# Patient Record
Sex: FEMALE | Race: WHITE | Employment: FULL TIME | URBAN - METROPOLITAN AREA
[De-identification: names, ages, dates, MRNs, and addresses within clinical notes are randomized per-mention and may not be internally consistent; named-entity substitution may affect disease eponyms.]

---

## 2017-01-03 ENCOUNTER — HOSPITAL ENCOUNTER (OUTPATIENT)
Dept: OTHER | Age: 32
Discharge: HOME | End: 2017-01-03
Attending: OBSTETRICS & GYNECOLOGY | Admitting: OBSTETRICS & GYNECOLOGY

## 2017-01-03 DIAGNOSIS — O99.810 ABNORMAL GLUCOSE COMPLICATING PREGNANCY: ICD-10-CM

## 2017-01-03 LAB
GLUCOSE FASTING: 85 MG/DL
GLUCOSE TOLERANCE TEST 1 HOUR: 201 MG/DL
GLUCOSE TOLERANCE TEST 2 HOUR: 167 MG/DL
GLUCOSE TOLERANCE TEST 3 HOUR: 126 MG/DL

## 2020-06-02 LAB
ABO/RH: NORMAL
ANTIBODY SCREEN: NORMAL
GLUCOSE CHALLENGE: 134 MG/DL
HEPATITIS C ANTIBODY INTERPRETATION: NORMAL

## 2020-06-03 LAB
BASOPHILS ABSOLUTE: 0 K/UL (ref 0–0.2)
BASOPHILS RELATIVE PERCENT: 0.4 %
EOSINOPHILS ABSOLUTE: 0.1 K/UL (ref 0–0.6)
EOSINOPHILS RELATIVE PERCENT: 0.9 %
HCT VFR BLD CALC: 37.8 % (ref 36–48)
HEMOGLOBIN: 12.7 G/DL (ref 12–16)
HEPATITIS B SURFACE ANTIGEN INTERPRETATION: ABNORMAL
HIV AG/AB: NORMAL
HIV ANTIGEN: NORMAL
HIV-1 ANTIBODY: NORMAL
HIV-2 AB: NORMAL
LYMPHOCYTES ABSOLUTE: 1.2 K/UL (ref 1–5.1)
LYMPHOCYTES RELATIVE PERCENT: 13.2 %
MCH RBC QN AUTO: 27 PG (ref 26–34)
MCHC RBC AUTO-ENTMCNC: 33.7 G/DL (ref 31–36)
MCV RBC AUTO: 80.2 FL (ref 80–100)
MONOCYTES ABSOLUTE: 0.5 K/UL (ref 0–1.3)
MONOCYTES RELATIVE PERCENT: 5 %
NEUTROPHILS ABSOLUTE: 7.3 K/UL (ref 1.7–7.7)
NEUTROPHILS RELATIVE PERCENT: 80.5 %
PDW BLD-RTO: 16.6 % (ref 12.4–15.4)
PLATELET # BLD: 227 K/UL (ref 135–450)
PMV BLD AUTO: 9.1 FL (ref 5–10.5)
RBC # BLD: 4.71 M/UL (ref 4–5.2)
RUBELLA ANTIBODY IGG: >500 IU/ML
TOTAL SYPHILLIS IGG/IGM: ABNORMAL
WBC # BLD: 9.1 K/UL (ref 4–11)

## 2020-06-04 LAB
HPV COMMENT: NORMAL
HPV TYPE 16: NOT DETECTED
HPV TYPE 18: NOT DETECTED
HPVOH (OTHER TYPES): NOT DETECTED
URINE CULTURE, ROUTINE: NORMAL

## 2020-07-13 ENCOUNTER — HOSPITAL ENCOUNTER (OUTPATIENT)
Dept: OTHER | Age: 35
Discharge: HOME | End: 2020-07-13
Payer: COMMERCIAL

## 2020-07-13 LAB
GLUCOSE FASTING: 98 MG/DL
GLUCOSE TOLERANCE TEST 1 HOUR: 161 MG/DL
GLUCOSE TOLERANCE TEST 2 HOUR: 156 MG/DL
GLUCOSE TOLERANCE TEST 3 HOUR: 136 MG/DL

## 2020-07-13 PROCEDURE — 82951 GLUCOSE TOLERANCE TEST (GTT): CPT

## 2020-07-13 PROCEDURE — 82952 GTT-ADDED SAMPLES: CPT

## 2020-07-13 PROCEDURE — 36415 COLL VENOUS BLD VENIPUNCTURE: CPT

## 2020-08-13 ENCOUNTER — HOSPITAL ENCOUNTER (OUTPATIENT)
Dept: DIABETES SERVICES | Age: 35
Setting detail: THERAPIES SERIES
Discharge: HOME | End: 2020-08-13
Payer: COMMERCIAL

## 2020-08-13 PROCEDURE — G0109 DIAB MANAGE TRN IND/GROUP: HCPCS

## 2020-08-13 NOTE — LETTER
Diabetes Education  [facility])    TO: Jose Alfredo Kidney    RE: Dian Machado YOB: 1985    Your patient was seen virtually for Gestational Diabetes Education on 20. EDUCATION INCLUDED:   [x]  Definition of Gestational Diabetes                                                                                 [x]  Risk Factors/Acute Complications     [x]  Blood Glucose Monitoring        [x]  Exercise/Activity    [x]  Carbohydrate Counting  [x]  Timing of Meals    [x]  Food Logs  [x]  Eating Out  [x]  Nutrition Precautions for Pregnancy  [x]  Use of Blood glucose meter:       Recent Blood glucose : FB-155  PP:126-140  [x]  PHQ2 Depression Screen; patient denies depression. PATIENT GOALS:  [x]  Follow meal plan with appropriate Carbohydrate intake:   Breakfast: 15 - 30 g Carb    Lunch: 30 - 45 g Carb   Dinner:  45 - 60 g Carb    3 snacks: 15 - 30 g Carb each  [x] Monitor blood glucose four times daily. FBG Goal: 60 - 95 or as recommended by physician   1hr PP <140 or as recommended by physician   2 hr PP <120 or as recommended by physician  [x] Exercise    [x] Log glucose results and bring to physicians office at each scheduled appointment  [] Other:     PLAN:  [x] Return for follow-up virtual visit to review Food/Log      Thank you for the opportunity to provide Diabetes Self Management Education to your patient.     Jasson Coronado  Diabetes Educator

## 2020-08-18 NOTE — PROGRESS NOTES
Gestational Diabetes Patient Assessment and Education    Name: Katarina Robles : 1985  EDC:21        Education Completed  [x]  Definition of Gestational Diabetes                                                                                 [x]  Risk Factors/Acute Complications     [x]  Blood Glucose Monitoring--schedule, target levels     [x]  Exercise/Activity    [x]  Carbohydrate Counting  [x]  Timing of Meals    [x]  Food Logs  [x]  Eating Out  [x]  Nutrition Precautions for Pregnancy  [x]  Use of Blood glucose meter            Patient Goals  [x]  Follow meal plan with appropriate Carbohydrate intake:   Breakfast: 30 g Carb    Lunch: 30 - 45 g Carb   Dinner:  45 - 60 g Carb    3 snacks: 15 - 30 g Carb each  [x] Monitor blood glucose four times daily.     FBG Goal: 60 - 95 or as recommended by physician   1hr PP <140 or as recommended by physician   2 hr PP <120 or as recommended by physician  [x] Exercise    [x] Log glucose results and bring to physicians office at each scheduled appointment  [] Other:     Plan  [x] Return for follow-up visit to review Food/Log      Referring Provider: Peter Chu    Total participants in Group:3

## 2020-08-19 ENCOUNTER — HOSPITAL ENCOUNTER (OUTPATIENT)
Dept: DIABETES SERVICES | Age: 35
Setting detail: THERAPIES SERIES
Discharge: HOME | End: 2020-08-19
Payer: COMMERCIAL

## 2020-08-19 PROCEDURE — G0108 DIAB MANAGE TRN  PER INDIV: HCPCS

## 2020-08-19 NOTE — LETTER
Diabetes Education  [facility])    TO: Dr. Patience Bartholomew    RE: Valente RUSTO.B.: 1985    Your patient attended a Virtual Follow-up session for Gestational Diabetes on 20. Education included the following:   [x] Review of BG Log   Fasting BG Range:102     1 hr PP BG Range: <130      2 hr PP BG Range:                                                             [x] Review of Food Log   [] Additional Resources  [x]  Diet guidelines for sick days  [x] Post-Partum Guidelines  [] Other:    Post Program Recommendations include:  [x] Diabetes Screening 6 - 12 weeks post partum  [] Pre-Diabetes Group Class   [] Other : Thank you for the opportunity to provide Diabetes Education to your patient.     [id number] Diabetes Educator

## 2020-08-27 NOTE — PROGRESS NOTES
Gestational Diabetes Patient Assessment and Education    Name: Brent Owens : 1985  EDC:21    BG/FOOD LOG REVIEW:  [x]  BG Log reviewed: FBG range: 102   PP BG range: <130  []  Other:    Education Completed  [x]  Problem Solving   [x]  Carbohydrate Counting  [x]  Timing of Meals    [x]  Post-Partum Guidelines  [x]  Diet guidelines for sick days  []  Additional Resources  []  Other:    Participant selected Post-Program Diabetes Self-Management Support Plan:   [] Nurse appointment  [] Dietitian appointment  [x] Follow-up with Referring Provider  [] Other:       Referring Provider: Tomasz Sales  Total time spent with patient: 30 minute telephone

## 2020-09-09 ENCOUNTER — ROUTINE PRENATAL (OUTPATIENT)
Dept: PERINATAL CARE | Age: 35
End: 2020-09-09
Payer: COMMERCIAL

## 2020-09-09 VITALS
DIASTOLIC BLOOD PRESSURE: 78 MMHG | BODY MASS INDEX: 24.02 KG/M2 | WEIGHT: 123 LBS | HEART RATE: 106 BPM | SYSTOLIC BLOOD PRESSURE: 124 MMHG

## 2020-09-09 PROCEDURE — 76811 OB US DETAILED SNGL FETUS: CPT

## 2020-09-09 RX ORDER — FERROUS SULFATE 325(65) MG
325 TABLET ORAL
COMMUNITY

## 2020-09-09 NOTE — PROGRESS NOTES
[facility]    The patient was seen for a detailed sonogram.    Please see the full ultrasound report under the Media tab.     Hermila Landaverde  09/09/20

## 2020-09-09 NOTE — PROGRESS NOTES
T-97.8 Level II USD, prev pregnancy with bladder extrophy. Reports + fetal movement, denies bleeding, leaking of fluid or ctx.

## 2020-10-13 ENCOUNTER — HOSPITAL ENCOUNTER (OUTPATIENT)
Age: 35
Discharge: HOME | End: 2020-10-13
Attending: OBSTETRICS & GYNECOLOGY | Admitting: OBSTETRICS & GYNECOLOGY
Payer: COMMERCIAL

## 2020-10-13 VITALS
TEMPERATURE: 98.6 F | HEART RATE: 89 BPM | DIASTOLIC BLOOD PRESSURE: 81 MMHG | SYSTOLIC BLOOD PRESSURE: 136 MMHG | RESPIRATION RATE: 18 BRPM

## 2020-10-13 PROBLEM — Z29.13 NEED FOR RHOGAM DUE TO RH NEGATIVE MOTHER: Status: ACTIVE | Noted: 2020-10-13

## 2020-10-13 LAB — RHIG LOT NUMBER: NORMAL

## 2020-10-13 PROCEDURE — 90715 TDAP VACCINE 7 YRS/> IM: CPT | Performed by: OBSTETRICS & GYNECOLOGY

## 2020-10-13 PROCEDURE — 90471 IMMUNIZATION ADMIN: CPT | Performed by: OBSTETRICS & GYNECOLOGY

## 2020-10-13 PROCEDURE — 6360000002 HC RX W HCPCS: Performed by: OBSTETRICS & GYNECOLOGY

## 2020-10-13 PROCEDURE — 96372 THER/PROPH/DIAG INJ SC/IM: CPT

## 2020-10-13 RX ADMIN — HUMAN RHO(D) IMMUNE GLOBULIN 300 MCG: 300 INJECTION, SOLUTION INTRAMUSCULAR at 18:39

## 2020-10-13 RX ADMIN — TETANUS TOXOID, REDUCED DIPHTHERIA TOXOID AND ACELLULAR PERTUSSIS VACCINE, ADSORBED 0.5 ML: 5; 2.5; 8; 8; 2.5 SUSPENSION INTRAMUSCULAR at 18:20

## 2023-06-30 ENCOUNTER — APPOINTMENT (OUTPATIENT)
Dept: URBAN - METROPOLITAN AREA CLINIC 205 | Age: 38
Setting detail: DERMATOLOGY
End: 2023-06-30

## 2023-06-30 DIAGNOSIS — L28.0 LICHEN SIMPLEX CHRONICUS: ICD-10-CM

## 2023-06-30 DIAGNOSIS — D18.0 HEMANGIOMA: ICD-10-CM

## 2023-06-30 DIAGNOSIS — Z71.89 OTHER SPECIFIED COUNSELING: ICD-10-CM

## 2023-06-30 DIAGNOSIS — L82.1 OTHER SEBORRHEIC KERATOSIS: ICD-10-CM

## 2023-06-30 PROBLEM — D18.01 HEMANGIOMA OF SKIN AND SUBCUTANEOUS TISSUE: Status: ACTIVE | Noted: 2023-06-30

## 2023-06-30 PROCEDURE — OTHER TREATMENT REGIMEN: OTHER

## 2023-06-30 PROCEDURE — 99203 OFFICE O/P NEW LOW 30 MIN: CPT

## 2023-06-30 PROCEDURE — OTHER MIPS QUALITY: OTHER

## 2023-06-30 PROCEDURE — OTHER PRESCRIPTION: OTHER

## 2023-06-30 PROCEDURE — OTHER COUNSELING: OTHER

## 2023-06-30 RX ORDER — TRIAMCINOLONE ACETONIDE 1 MG/G
CREAM TOPICAL BID
Qty: 15 | Refills: 0 | Status: ERX | COMMUNITY
Start: 2023-06-30

## 2023-06-30 ASSESSMENT — LOCATION DETAILED DESCRIPTION DERM
LOCATION DETAILED: RIGHT RADIAL DORSAL HAND
LOCATION DETAILED: RIGHT RADIAL DORSAL HAND

## 2023-06-30 ASSESSMENT — LOCATION ZONE DERM
LOCATION ZONE: HAND
LOCATION ZONE: HAND

## 2023-06-30 ASSESSMENT — LOCATION SIMPLE DESCRIPTION DERM
LOCATION SIMPLE: RIGHT HAND
LOCATION SIMPLE: RIGHT HAND

## 2023-06-30 NOTE — HPI: SECONDARY COMPLAINT
Additional History: Dry scaly itchy rash on R dorsal hand x month. Moisturizer makes it feel a bit better.

## 2023-06-30 NOTE — PROCEDURE: TREATMENT REGIMEN
Initiate Treatment: triamcinolone acetonide 0.1 % topical cream BID\\nQuantity: 15.0 g\\nSig: Apply twice daily to affected area x 2 weeks then decrease to twice a week x 4 weeks
Plan: I discussed LSC.  I encouraged her to stop scratching as this will make it worse.
Detail Level: Simple

## 2023-06-30 NOTE — HPI: FULL BODY SKIN EXAMINATION
What Is The Reason For Today's Visit?: Full Body Skin Examination
What Is The Reason For Today's Visit? (Being Monitored For X): concerning skin lesions on an annual basis
Additional History: Red spot on R lower leg x months. No Hx of skin cancer per pt.

## 2023-08-14 ENCOUNTER — APPOINTMENT (OUTPATIENT)
Dept: URBAN - METROPOLITAN AREA CLINIC 205 | Age: 38
Setting detail: DERMATOLOGY
End: 2023-08-14

## 2023-08-14 DIAGNOSIS — L28.0 LICHEN SIMPLEX CHRONICUS: ICD-10-CM

## 2023-08-14 PROCEDURE — OTHER COUNSELING: OTHER

## 2023-08-14 PROCEDURE — 99213 OFFICE O/P EST LOW 20 MIN: CPT

## 2023-08-14 PROCEDURE — OTHER PRESCRIPTION: OTHER

## 2023-08-14 PROCEDURE — OTHER TREATMENT REGIMEN: OTHER

## 2023-08-14 RX ORDER — TRIAMCINOLONE ACETONIDE 1 MG/G
CREAM TOPICAL BID
Qty: 15 | Refills: 0 | Status: ERX

## 2023-08-14 RX ORDER — TACROLIMUS 1 MG/G
OINTMENT TOPICAL
Qty: 30 | Refills: 0 | Status: ERX | COMMUNITY
Start: 2023-08-14

## 2023-08-14 ASSESSMENT — LOCATION SIMPLE DESCRIPTION DERM
LOCATION SIMPLE: RIGHT HAND
LOCATION SIMPLE: RIGHT HAND

## 2023-08-14 ASSESSMENT — LOCATION ZONE DERM
LOCATION ZONE: HAND
LOCATION ZONE: HAND

## 2023-08-14 ASSESSMENT — LOCATION DETAILED DESCRIPTION DERM
LOCATION DETAILED: RIGHT RADIAL DORSAL HAND
LOCATION DETAILED: RIGHT RADIAL DORSAL HAND

## 2023-08-14 NOTE — PROCEDURE: TREATMENT REGIMEN
Initiate Treatment: tacrolimus 0.1 % topical ointment \\nQuantity: 30.0 g  Days Supply: 30\\nSig: Apply to skin daily x 6 weeks then take two weeks and repeat if needed
Plan: I discussed LSC.  I encouraged her to stop scratching as this will make it worse.
Continue Regimen: triamcinolone acetonide 0.1 % topical cream BID\\nQuantity: 15.0 g\\nSig: Apply to affected area twice a week x 4 weeks\\nOkay to use twice a day x 2 week again
Detail Level: Simple

## 2025-06-05 ENCOUNTER — APPOINTMENT (OUTPATIENT)
Dept: URBAN - METROPOLITAN AREA CLINIC 205 | Age: 40
Setting detail: DERMATOLOGY
End: 2025-06-05

## 2025-06-05 DIAGNOSIS — L82.1 OTHER SEBORRHEIC KERATOSIS: ICD-10-CM

## 2025-06-05 DIAGNOSIS — D22 MELANOCYTIC NEVI: ICD-10-CM

## 2025-06-05 DIAGNOSIS — D18.0 HEMANGIOMA: ICD-10-CM

## 2025-06-05 DIAGNOSIS — Z71.89 OTHER SPECIFIED COUNSELING: ICD-10-CM

## 2025-06-05 PROBLEM — D18.01 HEMANGIOMA OF SKIN AND SUBCUTANEOUS TISSUE: Status: ACTIVE | Noted: 2025-06-05

## 2025-06-05 PROBLEM — D22.9 MELANOCYTIC NEVI, UNSPECIFIED: Status: ACTIVE | Noted: 2025-06-05

## 2025-06-05 PROCEDURE — OTHER COUNSELING: OTHER

## 2025-06-05 PROCEDURE — OTHER SUNSCREEN RECOMMENDATIONS: OTHER

## 2025-06-05 PROCEDURE — 99213 OFFICE O/P EST LOW 20 MIN: CPT
